# Patient Record
Sex: FEMALE | Race: WHITE | NOT HISPANIC OR LATINO | Employment: PART TIME | URBAN - METROPOLITAN AREA
[De-identification: names, ages, dates, MRNs, and addresses within clinical notes are randomized per-mention and may not be internally consistent; named-entity substitution may affect disease eponyms.]

---

## 2023-06-19 ENCOUNTER — ANESTHESIA EVENT (OUTPATIENT)
Dept: SURGERY | Facility: MEDICAL CENTER | Age: 43
End: 2023-06-19
Payer: COMMERCIAL

## 2023-06-19 ENCOUNTER — HOSPITAL ENCOUNTER (OUTPATIENT)
Dept: RADIOLOGY | Facility: MEDICAL CENTER | Age: 43
End: 2023-06-19

## 2023-06-19 ENCOUNTER — ANESTHESIA (OUTPATIENT)
Dept: SURGERY | Facility: MEDICAL CENTER | Age: 43
End: 2023-06-19
Payer: COMMERCIAL

## 2023-06-19 ENCOUNTER — HOSPITAL ENCOUNTER (EMERGENCY)
Facility: MEDICAL CENTER | Age: 43
End: 2023-06-19
Attending: EMERGENCY MEDICINE
Payer: COMMERCIAL

## 2023-06-19 ENCOUNTER — APPOINTMENT (OUTPATIENT)
Dept: RADIOLOGY | Facility: MEDICAL CENTER | Age: 43
End: 2023-06-19
Attending: STUDENT IN AN ORGANIZED HEALTH CARE EDUCATION/TRAINING PROGRAM
Payer: COMMERCIAL

## 2023-06-19 VITALS
DIASTOLIC BLOOD PRESSURE: 56 MMHG | HEART RATE: 85 BPM | RESPIRATION RATE: 20 BRPM | WEIGHT: 221.34 LBS | SYSTOLIC BLOOD PRESSURE: 108 MMHG | TEMPERATURE: 98.6 F | OXYGEN SATURATION: 96 %

## 2023-06-19 DIAGNOSIS — D68.00 VON WILLEBRAND DISEASE (HCC): ICD-10-CM

## 2023-06-19 DIAGNOSIS — S62.521B OPEN FRACTURE OF BASE OF DISTAL PHALANX OF RIGHT THUMB: ICD-10-CM
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
## 2023-06-19 LAB
ALBUMIN SERPL BCP-MCNC: 4.6 G/DL (ref 3.2–4.9)
ALBUMIN/GLOB SERPL: 1.7 G/DL
ALP SERPL-CCNC: 67 U/L (ref 30–99)
ALT SERPL-CCNC: 25 U/L (ref 2–50)
ANION GAP SERPL CALC-SCNC: 14 MMOL/L (ref 7–16)
APTT PPP: 33.9 SEC (ref 24.7–36)
AST SERPL-CCNC: 22 U/L (ref 12–45)
BASOPHILS # BLD AUTO: 0.6 % (ref 0–1.8)
BASOPHILS # BLD: 0.08 K/UL (ref 0–0.12)
BILIRUB SERPL-MCNC: 0.5 MG/DL (ref 0.1–1.5)
BUN SERPL-MCNC: 8 MG/DL (ref 8–22)
CALCIUM ALBUM COR SERPL-MCNC: 8.7 MG/DL (ref 8.5–10.5)
CALCIUM SERPL-MCNC: 9.2 MG/DL (ref 8.5–10.5)
CHLORIDE SERPL-SCNC: 104 MMOL/L (ref 96–112)
CO2 SERPL-SCNC: 21 MMOL/L (ref 20–33)
CREAT SERPL-MCNC: 0.79 MG/DL (ref 0.5–1.4)
EOSINOPHIL # BLD AUTO: 0.11 K/UL (ref 0–0.51)
EOSINOPHIL NFR BLD: 0.8 % (ref 0–6.9)
ERYTHROCYTE [DISTWIDTH] IN BLOOD BY AUTOMATED COUNT: 44 FL (ref 35.9–50)
GFR SERPLBLD CREATININE-BSD FMLA CKD-EPI: 95 ML/MIN/1.73 M 2
GLOBULIN SER CALC-MCNC: 2.7 G/DL (ref 1.9–3.5)
GLUCOSE SERPL-MCNC: 92 MG/DL (ref 65–99)
HCG SERPL QL: NEGATIVE
HCG UR QL: NEGATIVE
HCT VFR BLD AUTO: 40.2 % (ref 37–47)
HGB BLD-MCNC: 13.5 G/DL (ref 12–16)
IMM GRANULOCYTES # BLD AUTO: 0.04 K/UL (ref 0–0.11)
IMM GRANULOCYTES NFR BLD AUTO: 0.3 % (ref 0–0.9)
INR PPP: 1.09 (ref 0.87–1.13)
LYMPHOCYTES # BLD AUTO: 4.04 K/UL (ref 1–4.8)
LYMPHOCYTES NFR BLD: 29.8 % (ref 22–41)
MCH RBC QN AUTO: 30.4 PG (ref 27–33)
MCHC RBC AUTO-ENTMCNC: 33.6 G/DL (ref 32.2–35.5)
MCV RBC AUTO: 90.5 FL (ref 81.4–97.8)
MONOCYTES # BLD AUTO: 0.71 K/UL (ref 0–0.85)
MONOCYTES NFR BLD AUTO: 5.2 % (ref 0–13.4)
NEUTROPHILS # BLD AUTO: 8.58 K/UL (ref 1.82–7.42)
NEUTROPHILS NFR BLD: 63.3 % (ref 44–72)
NRBC # BLD AUTO: 0 K/UL
NRBC BLD-RTO: 0 /100 WBC (ref 0–0.2)
PLATELET # BLD AUTO: 263 K/UL (ref 164–446)
PMV BLD AUTO: 9.5 FL (ref 9–12.9)
POTASSIUM SERPL-SCNC: 4.2 MMOL/L (ref 3.6–5.5)
PROT SERPL-MCNC: 7.3 G/DL (ref 6–8.2)
PROTHROMBIN TIME: 14 SEC (ref 12–14.6)
RBC # BLD AUTO: 4.44 M/UL (ref 4.2–5.4)
SODIUM SERPL-SCNC: 139 MMOL/L (ref 135–145)
WBC # BLD AUTO: 13.6 K/UL (ref 4.8–10.8)

## 2023-06-19 PROCEDURE — 84703 CHORIONIC GONADOTROPIN ASSAY: CPT

## 2023-06-19 PROCEDURE — 160035 HCHG PACU - 1ST 60 MINS PHASE I: Performed by: STUDENT IN AN ORGANIZED HEALTH CARE EDUCATION/TRAINING PROGRAM

## 2023-06-19 PROCEDURE — C1713 ANCHOR/SCREW BN/BN,TIS/BN: HCPCS | Performed by: STUDENT IN AN ORGANIZED HEALTH CARE EDUCATION/TRAINING PROGRAM

## 2023-06-19 PROCEDURE — 81025 URINE PREGNANCY TEST: CPT

## 2023-06-19 PROCEDURE — 85025 COMPLETE CBC W/AUTO DIFF WBC: CPT

## 2023-06-19 PROCEDURE — 36415 COLL VENOUS BLD VENIPUNCTURE: CPT

## 2023-06-19 PROCEDURE — 700111 HCHG RX REV CODE 636 W/ 250 OVERRIDE (IP): Performed by: EMERGENCY MEDICINE

## 2023-06-19 PROCEDURE — 99291 CRITICAL CARE FIRST HOUR: CPT

## 2023-06-19 PROCEDURE — 700105 HCHG RX REV CODE 258: Performed by: ANESTHESIOLOGY

## 2023-06-19 PROCEDURE — 160002 HCHG RECOVERY MINUTES (STAT): Performed by: STUDENT IN AN ORGANIZED HEALTH CARE EDUCATION/TRAINING PROGRAM

## 2023-06-19 PROCEDURE — 700111 HCHG RX REV CODE 636 W/ 250 OVERRIDE (IP)

## 2023-06-19 PROCEDURE — 90715 TDAP VACCINE 7 YRS/> IM: CPT | Performed by: EMERGENCY MEDICINE

## 2023-06-19 PROCEDURE — 160036 HCHG PACU - EA ADDL 30 MINS PHASE I: Performed by: STUDENT IN AN ORGANIZED HEALTH CARE EDUCATION/TRAINING PROGRAM

## 2023-06-19 PROCEDURE — 160039 HCHG SURGERY MINUTES - EA ADDL 1 MIN LEVEL 3: Performed by: STUDENT IN AN ORGANIZED HEALTH CARE EDUCATION/TRAINING PROGRAM

## 2023-06-19 PROCEDURE — 85730 THROMBOPLASTIN TIME PARTIAL: CPT

## 2023-06-19 PROCEDURE — 99222 1ST HOSP IP/OBS MODERATE 55: CPT | Mod: 57 | Performed by: STUDENT IN AN ORGANIZED HEALTH CARE EDUCATION/TRAINING PROGRAM

## 2023-06-19 PROCEDURE — 700111 HCHG RX REV CODE 636 W/ 250 OVERRIDE (IP): Performed by: ANESTHESIOLOGY

## 2023-06-19 PROCEDURE — 80053 COMPREHEN METABOLIC PANEL: CPT

## 2023-06-19 PROCEDURE — 96365 THER/PROPH/DIAG IV INF INIT: CPT

## 2023-06-19 PROCEDURE — 11760 REPAIR OF NAIL BED: CPT | Mod: 80ROC,F5 | Performed by: STUDENT IN AN ORGANIZED HEALTH CARE EDUCATION/TRAINING PROGRAM

## 2023-06-19 PROCEDURE — 11760 REPAIR OF NAIL BED: CPT | Mod: F5 | Performed by: STUDENT IN AN ORGANIZED HEALTH CARE EDUCATION/TRAINING PROGRAM

## 2023-06-19 PROCEDURE — 160028 HCHG SURGERY MINUTES - 1ST 30 MINS LEVEL 3: Performed by: STUDENT IN AN ORGANIZED HEALTH CARE EDUCATION/TRAINING PROGRAM

## 2023-06-19 PROCEDURE — 01820 ANES CLSD PX RDS U/W/H BONES: CPT | Performed by: ANESTHESIOLOGY

## 2023-06-19 PROCEDURE — 160009 HCHG ANES TIME/MIN: Performed by: STUDENT IN AN ORGANIZED HEALTH CARE EDUCATION/TRAINING PROGRAM

## 2023-06-19 PROCEDURE — 26756 PIN FINGER FRACTURE EACH: CPT | Mod: F5 | Performed by: STUDENT IN AN ORGANIZED HEALTH CARE EDUCATION/TRAINING PROGRAM

## 2023-06-19 PROCEDURE — 700102 HCHG RX REV CODE 250 W/ 637 OVERRIDE(OP): Performed by: ANESTHESIOLOGY

## 2023-06-19 PROCEDURE — 700111 HCHG RX REV CODE 636 W/ 250 OVERRIDE (IP): Performed by: STUDENT IN AN ORGANIZED HEALTH CARE EDUCATION/TRAINING PROGRAM

## 2023-06-19 PROCEDURE — A9270 NON-COVERED ITEM OR SERVICE: HCPCS | Performed by: ANESTHESIOLOGY

## 2023-06-19 PROCEDURE — 96375 TX/PRO/DX INJ NEW DRUG ADDON: CPT

## 2023-06-19 PROCEDURE — 26756 PIN FINGER FRACTURE EACH: CPT | Mod: 80ROC,F5 | Performed by: STUDENT IN AN ORGANIZED HEALTH CARE EDUCATION/TRAINING PROGRAM

## 2023-06-19 PROCEDURE — 160048 HCHG OR STATISTICAL LEVEL 1-5: Performed by: STUDENT IN AN ORGANIZED HEALTH CARE EDUCATION/TRAINING PROGRAM

## 2023-06-19 PROCEDURE — 73120 X-RAY EXAM OF HAND: CPT | Mod: RT

## 2023-06-19 PROCEDURE — 90471 IMMUNIZATION ADMIN: CPT

## 2023-06-19 PROCEDURE — 700105 HCHG RX REV CODE 258: Performed by: EMERGENCY MEDICINE

## 2023-06-19 PROCEDURE — 85610 PROTHROMBIN TIME: CPT

## 2023-06-19 DEVICE — WIRE K- SMTH .045 4 (6TX6=36) (6EA/PK): Type: IMPLANTABLE DEVICE | Site: HAND | Status: FUNCTIONAL

## 2023-06-19 RX ORDER — BUPIVACAINE HYDROCHLORIDE 2.5 MG/ML
INJECTION, SOLUTION EPIDURAL; INFILTRATION; INTRACAUDAL
Status: DISCONTINUED | OUTPATIENT
Start: 2023-06-19 | End: 2023-06-19 | Stop reason: HOSPADM

## 2023-06-19 RX ORDER — HYDROMORPHONE HYDROCHLORIDE 1 MG/ML
0.4 INJECTION, SOLUTION INTRAMUSCULAR; INTRAVENOUS; SUBCUTANEOUS
Status: DISCONTINUED | OUTPATIENT
Start: 2023-06-19 | End: 2023-06-20 | Stop reason: HOSPADM

## 2023-06-19 RX ORDER — HYDROMORPHONE HYDROCHLORIDE 1 MG/ML
0.5 INJECTION, SOLUTION INTRAMUSCULAR; INTRAVENOUS; SUBCUTANEOUS ONCE
Status: COMPLETED | OUTPATIENT
Start: 2023-06-19 | End: 2023-06-19

## 2023-06-19 RX ORDER — M-VIT,TX,IRON,MINS/CALC/FOLIC 27MG-0.4MG
1 TABLET ORAL DAILY
COMMUNITY

## 2023-06-19 RX ORDER — AMOXICILLIN AND CLAVULANATE POTASSIUM 875; 125 MG/1; MG/1
1 TABLET, FILM COATED ORAL 2 TIMES DAILY
Qty: 14 TABLET | Refills: 0 | Status: SHIPPED | OUTPATIENT
Start: 2023-06-19 | End: 2023-06-26

## 2023-06-19 RX ORDER — OXYCODONE HCL 5 MG/5 ML
10 SOLUTION, ORAL ORAL
Status: COMPLETED | OUTPATIENT
Start: 2023-06-19 | End: 2023-06-19

## 2023-06-19 RX ORDER — SODIUM CHLORIDE, SODIUM LACTATE, POTASSIUM CHLORIDE, CALCIUM CHLORIDE 600; 310; 30; 20 MG/100ML; MG/100ML; MG/100ML; MG/100ML
INJECTION, SOLUTION INTRAVENOUS
Status: DISCONTINUED | OUTPATIENT
Start: 2023-06-19 | End: 2023-06-19 | Stop reason: SURG

## 2023-06-19 RX ORDER — LABETALOL HYDROCHLORIDE 5 MG/ML
5 INJECTION, SOLUTION INTRAVENOUS
Status: DISCONTINUED | OUTPATIENT
Start: 2023-06-19 | End: 2023-06-20 | Stop reason: HOSPADM

## 2023-06-19 RX ORDER — ONDANSETRON 2 MG/ML
INJECTION INTRAMUSCULAR; INTRAVENOUS PRN
Status: DISCONTINUED | OUTPATIENT
Start: 2023-06-19 | End: 2023-06-19 | Stop reason: SURG

## 2023-06-19 RX ORDER — HYDROMORPHONE HYDROCHLORIDE 1 MG/ML
0.2 INJECTION, SOLUTION INTRAMUSCULAR; INTRAVENOUS; SUBCUTANEOUS
Status: DISCONTINUED | OUTPATIENT
Start: 2023-06-19 | End: 2023-06-20 | Stop reason: HOSPADM

## 2023-06-19 RX ORDER — MIDAZOLAM HYDROCHLORIDE 1 MG/ML
1 INJECTION INTRAMUSCULAR; INTRAVENOUS
Status: DISCONTINUED | OUTPATIENT
Start: 2023-06-19 | End: 2023-06-20 | Stop reason: HOSPADM

## 2023-06-19 RX ORDER — IPRATROPIUM BROMIDE AND ALBUTEROL SULFATE 2.5; .5 MG/3ML; MG/3ML
3 SOLUTION RESPIRATORY (INHALATION)
Status: DISCONTINUED | OUTPATIENT
Start: 2023-06-19 | End: 2023-06-20 | Stop reason: HOSPADM

## 2023-06-19 RX ORDER — SODIUM CHLORIDE, SODIUM LACTATE, POTASSIUM CHLORIDE, CALCIUM CHLORIDE 600; 310; 30; 20 MG/100ML; MG/100ML; MG/100ML; MG/100ML
1000 INJECTION, SOLUTION INTRAVENOUS ONCE
Status: COMPLETED | OUTPATIENT
Start: 2023-06-19 | End: 2023-06-19

## 2023-06-19 RX ORDER — BENAZEPRIL HYDROCHLORIDE 5 MG/1
5 TABLET ORAL DAILY
COMMUNITY

## 2023-06-19 RX ORDER — OXYCODONE HYDROCHLORIDE 5 MG/1
5 TABLET ORAL EVERY 4 HOURS PRN
Qty: 15 TABLET | Refills: 0 | Status: SHIPPED | OUTPATIENT
Start: 2023-06-19 | End: 2023-06-22

## 2023-06-19 RX ORDER — HALOPERIDOL 5 MG/ML
1 INJECTION INTRAMUSCULAR
Status: DISCONTINUED | OUTPATIENT
Start: 2023-06-19 | End: 2023-06-20 | Stop reason: HOSPADM

## 2023-06-19 RX ORDER — ONDANSETRON 2 MG/ML
4 INJECTION INTRAMUSCULAR; INTRAVENOUS
Status: DISCONTINUED | OUTPATIENT
Start: 2023-06-19 | End: 2023-06-20 | Stop reason: HOSPADM

## 2023-06-19 RX ORDER — SODIUM CHLORIDE, SODIUM LACTATE, POTASSIUM CHLORIDE, CALCIUM CHLORIDE 600; 310; 30; 20 MG/100ML; MG/100ML; MG/100ML; MG/100ML
INJECTION, SOLUTION INTRAVENOUS CONTINUOUS
Status: DISCONTINUED | OUTPATIENT
Start: 2023-06-19 | End: 2023-06-20 | Stop reason: HOSPADM

## 2023-06-19 RX ORDER — HYDROMORPHONE HYDROCHLORIDE 1 MG/ML
0.1 INJECTION, SOLUTION INTRAMUSCULAR; INTRAVENOUS; SUBCUTANEOUS
Status: DISCONTINUED | OUTPATIENT
Start: 2023-06-19 | End: 2023-06-20 | Stop reason: HOSPADM

## 2023-06-19 RX ORDER — OXYCODONE HCL 5 MG/5 ML
5 SOLUTION, ORAL ORAL
Status: COMPLETED | OUTPATIENT
Start: 2023-06-19 | End: 2023-06-19

## 2023-06-19 RX ORDER — ATORVASTATIN CALCIUM 40 MG/1
40 TABLET, FILM COATED ORAL NIGHTLY
COMMUNITY

## 2023-06-19 RX ORDER — MEPERIDINE HYDROCHLORIDE 25 MG/ML
12.5 INJECTION INTRAMUSCULAR; INTRAVENOUS; SUBCUTANEOUS
Status: DISCONTINUED | OUTPATIENT
Start: 2023-06-19 | End: 2023-06-20 | Stop reason: HOSPADM

## 2023-06-19 RX ORDER — MECLIZINE HYDROCHLORIDE 25 MG/1
25 TABLET ORAL 3 TIMES DAILY PRN
COMMUNITY

## 2023-06-19 RX ORDER — CEFAZOLIN SODIUM 1 G/3ML
INJECTION, POWDER, FOR SOLUTION INTRAMUSCULAR; INTRAVENOUS PRN
Status: DISCONTINUED | OUTPATIENT
Start: 2023-06-19 | End: 2023-06-19 | Stop reason: SURG

## 2023-06-19 RX ORDER — HYDRALAZINE HYDROCHLORIDE 20 MG/ML
5 INJECTION INTRAMUSCULAR; INTRAVENOUS
Status: DISCONTINUED | OUTPATIENT
Start: 2023-06-19 | End: 2023-06-20 | Stop reason: HOSPADM

## 2023-06-19 RX ORDER — DIPHENHYDRAMINE HYDROCHLORIDE 50 MG/ML
12.5 INJECTION INTRAMUSCULAR; INTRAVENOUS
Status: DISCONTINUED | OUTPATIENT
Start: 2023-06-19 | End: 2023-06-20 | Stop reason: HOSPADM

## 2023-06-19 RX ORDER — MIDAZOLAM HYDROCHLORIDE 1 MG/ML
INJECTION INTRAMUSCULAR; INTRAVENOUS PRN
Status: DISCONTINUED | OUTPATIENT
Start: 2023-06-19 | End: 2023-06-19 | Stop reason: SURG

## 2023-06-19 RX ORDER — MEPERIDINE HYDROCHLORIDE 25 MG/ML
INJECTION INTRAMUSCULAR; INTRAVENOUS; SUBCUTANEOUS
Status: COMPLETED
Start: 2023-06-19 | End: 2023-06-19

## 2023-06-19 RX ORDER — EPHEDRINE SULFATE 50 MG/ML
5 INJECTION, SOLUTION INTRAVENOUS
Status: DISCONTINUED | OUTPATIENT
Start: 2023-06-19 | End: 2023-06-20 | Stop reason: HOSPADM

## 2023-06-19 RX ADMIN — MEPERIDINE HYDROCHLORIDE 12.5 MG: 25 INJECTION INTRAMUSCULAR; INTRAVENOUS; SUBCUTANEOUS at 20:08

## 2023-06-19 RX ADMIN — ONDANSETRON 4 MG: 2 INJECTION INTRAMUSCULAR; INTRAVENOUS at 19:26

## 2023-06-19 RX ADMIN — FENTANYL CITRATE 100 MCG: 50 INJECTION, SOLUTION INTRAMUSCULAR; INTRAVENOUS at 13:30

## 2023-06-19 RX ADMIN — OXYCODONE HYDROCHLORIDE 10 MG: 5 SOLUTION ORAL at 20:28

## 2023-06-19 RX ADMIN — SODIUM CHLORIDE, POTASSIUM CHLORIDE, SODIUM LACTATE AND CALCIUM CHLORIDE 1000 ML: 600; 310; 30; 20 INJECTION, SOLUTION INTRAVENOUS at 13:29

## 2023-06-19 RX ADMIN — PROPOFOL 200 MG: 10 INJECTION, EMULSION INTRAVENOUS at 19:21

## 2023-06-19 RX ADMIN — MIDAZOLAM 2 MG: 1 INJECTION, SOLUTION INTRAMUSCULAR; INTRAVENOUS at 19:19

## 2023-06-19 RX ADMIN — AMPICILLIN AND SULBACTAM 3 G: 1; 2 INJECTION, POWDER, FOR SOLUTION INTRAMUSCULAR; INTRAVENOUS at 14:42

## 2023-06-19 RX ADMIN — SODIUM CHLORIDE, POTASSIUM CHLORIDE, SODIUM LACTATE AND CALCIUM CHLORIDE: 600; 310; 30; 20 INJECTION, SOLUTION INTRAVENOUS at 20:30

## 2023-06-19 RX ADMIN — SODIUM CHLORIDE, POTASSIUM CHLORIDE, SODIUM LACTATE AND CALCIUM CHLORIDE: 600; 310; 30; 20 INJECTION, SOLUTION INTRAVENOUS at 19:17

## 2023-06-19 RX ADMIN — CEFAZOLIN 2 G: 1 INJECTION, POWDER, FOR SOLUTION INTRAMUSCULAR; INTRAVENOUS at 19:24

## 2023-06-19 RX ADMIN — HYDROMORPHONE HYDROCHLORIDE 0.5 MG: 1 INJECTION, SOLUTION INTRAMUSCULAR; INTRAVENOUS; SUBCUTANEOUS at 14:54

## 2023-06-19 RX ADMIN — CLOSTRIDIUM TETANI TOXOID ANTIGEN (FORMALDEHYDE INACTIVATED), CORYNEBACTERIUM DIPHTHERIAE TOXOID ANTIGEN (FORMALDEHYDE INACTIVATED), BORDETELLA PERTUSSIS TOXOID ANTIGEN (GLUTARALDEHYDE INACTIVATED), BORDETELLA PERTUSSIS FILAMENTOUS HEMAGGLUTININ ANTIGEN (FORMALDEHYDE INACTIVATED), BORDETELLA PERTUSSIS PERTACTIN ANTIGEN, AND BORDETELLA PERTUSSIS FIMBRIAE 2/3 ANTIGEN 0.5 ML: 5; 2; 2.5; 5; 3; 5 INJECTION, SUSPENSION INTRAMUSCULAR at 14:53

## 2023-06-19 ASSESSMENT — PAIN DESCRIPTION - PAIN TYPE
TYPE: ACUTE PAIN
TYPE: SURGICAL PAIN
TYPE: SURGICAL PAIN
TYPE: ACUTE PAIN
TYPE: SURGICAL PAIN
TYPE: ACUTE PAIN

## 2023-06-19 NOTE — ED PROVIDER NOTES
ED Provider Note    CHIEF COMPLAINT  Chief Complaint   Patient presents with    Sent by MD           Dog Bite       EXTERNAL RECORDS REVIEWED  External ED Note from [facility], noted to have x-ray with midshaft distal phalanx fracture, almost circumferential laceration, she was given Ancef and tetanus    HPI/ROS  LIMITATION TO HISTORY   Select: : None  OUTSIDE HISTORIAN(S):  none    Aissatou John is a 42 y.o. female who presents with a dog bite and injury to her right thumb.  Patient is right-hand dominant, and around 7:00 this morning she was trying to restrain her dog which is a Doberman and up-to-date on immunizations.  She was bitten on the thumb, presented to outside facility and transferred here.  She reports pain to the thumb, no other focal areas of pain or injury.  Some tingling sensation as well.    She has no other recent illness fevers chills cough or congestion.  Does have a history of von Willebrand's disease        PAST MEDICAL HISTORY   has a past medical history of Von Willebrand disease (HCC).    SURGICAL HISTORY  patient denies any surgical history    FAMILY HISTORY  History reviewed. No pertinent family history.    SOCIAL HISTORY  Social History     Tobacco Use    Smoking status: Every Day     Types: Cigarettes    Smokeless tobacco: Not on file   Substance and Sexual Activity    Alcohol use: Not Currently    Drug use: Not Currently    Sexual activity: Not on file       CURRENT MEDICATIONS  Home Medications       Reviewed by Solomon Chadwick (Pharmacy Tech) on 06/19/23 at 1414  Med List Status: Complete     Medication Last Dose Status   atorvastatin (LIPITOR) 40 MG Tab 6/18/2023 Active   benazepril (LOTENSIN) 5 MG Tab 6/19/2023 Active   meclizine (ANTIVERT) 25 MG Tab 6/13/2023 Active   therapeutic multivitamin-minerals (THERAGRAN-M) Tab 6/19/2023 Active                    ALLERGIES  Allergies   Allergen Reactions    Codeine Vomiting and Nausea       PHYSICAL EXAM  VITAL  SIGNS: /72   Pulse 80   Temp 35.8 °C (96.5 °F) (Temporal)   Resp 16   Wt 100 kg (221 lb 5.5 oz)   SpO2 95%      Pulse ox interpretation: I interpret this pulse ox as normal.  Constitutional: Alert uncomfortable  HENT: Normocephalic, Atraumatic, Bilateral external ears normal. Nose normal.   Eyes: Pupils are equal and reactive. Conjunctiva normal, non-icteric.   Heart: Regular rate and rythm, no murmurs.    Lungs: Clear to auscultation bilaterally.      MSK:  Significant pain with removal of dressing and tenderness to the area pictured below.              Skin: Warm, Dry, No erythema, No rash.   Neurologic: Alert, Grossly non-focal.   Psychiatric: Affect normal, Judgment normal, Mood normal, Appears appropriate                   DIAGNOSTIC STUDIES / PROCEDURES  Labs Reviewed   CBC WITH DIFFERENTIAL - Abnormal; Notable for the following components:       Result Value    WBC 13.6 (*)     Neutrophils (Absolute) 8.58 (*)     All other components within normal limits   COMP METABOLIC PANEL   APTT    Narrative:     Indicate which anticoagulants the patient is on:->UNKNOWN   PROTHROMBIN TIME    Narrative:     Indicate which anticoagulants the patient is on:->UNKNOWN   CORRECTED CALCIUM   ESTIMATED GFR         RADIOLOGY  I have independently interpreted the diagnostic imaging associated with this visit and am waiting the final reading from the radiologist.   My preliminary interpretation is as follows: In reviewing the images from outlying facility there is a midshaft distal phalanx transverse fracture displaced      COURSE & MEDICAL DECISION MAKING      ED Observation Status? Yes; I am placing the patient in to an observation status due to a diagnostic uncertainty as well as therapeutic intensity. Patient placed in observation status at 1:26 PM, 6/19/2023.     Observation plan is as follows: IV antibiotics, pain control, definitive orthopedic consultation  Patient's care signed out to the oncoming emergency  physician Dr. Barahona for ED observation as above        INITIAL ASSESSMENT, COURSE AND PLAN  Care Narrative: 1:26 PM  Patient evaluated at the bedside, she does have a large laceration consistent with open fracture from a dog bite to her hand.  Distal capillary refill is intact, she does have some tingling sensation distally does appear to have viable tissue.  Will order additional antibiotics, orthopedic consultation, pain control    I discussed the case with the scrub tech in the ER with orthopedics, unfortunately are scrubbed in and cannot evaluate the patient, will keep the patient n.p.o. for orthopedic evaluation as I do not feel this is something amenable to primary closure by me at this time    HYDRATION: Based on the patient's presentation of Inability to take oral fluids the patient was given IV fluids. IV Hydration was used because oral hydration was not adequate alone. Upon recheck following hydration, the patient was stable.      ADDITIONAL PROBLEM LIST  #Open distal phalanx fracture.  Secondary to dog bite, patient with almost circumferential laceration around her mid portion of the distal phalanx of her right thumb and underlying displaced fracture.  This was irrigated prior, she was given Ancef at outside facility, started on Unasyn here, tetanus updated.  Wet dressing applied orthopedics consulted she is pending ultimate disposition after orthopedic evaluation for potential closure in the OR versus closure with orthopedics in the ER    #Dog bite, resulting in above.    #Von Willebrand's factor.  No findings of persistent bleeding here.  Pharmacy was consulted with potential need for DDAVP prior to OR although without bleeding at this time no current indications      DISPOSITION AND DISCUSSIONS  I have discussed management of the patient with the following physicians and VILLA's: Orthopedics team consulted for surgical evaluation    Discussion of management with other [facility] or appropriate source(s):  Pharmacy for DDAVP needs          FINAL DIAGNOSIS  1. Dog bite, initial encounter    2. Open fracture of base of distal phalanx of right thumb    3. Von Willebrand disease (HCC)           Electronically signed by: Javier Ochoa M.D., 6/19/2023 1:12 PM

## 2023-06-19 NOTE — ED NOTES
Med rec updated and complete. Allergies reviewed. Confirmed name and date of birth.        No antibiotic use in last 30 days.        Home pharmacy   [facility] = [phone]

## 2023-06-19 NOTE — ED NOTES
Delay to due to crit pt.  Report called to Bobbi STEVE in pre-op.  Pt reports last PO fluid at 0630 was coffee and last PO food was dinner last night.

## 2023-06-19 NOTE — DISCHARGE SUMMARY
ED Observation Discharge Summary    Patient:Aissatou John  Patient : 1980  Patient MRN: [id number]  Patient PCP: No primary care provider on file.    Admit Date: 2023  Discharge Date and Time: 23 4:02 PM  Discharge Diagnosis:   1. Dog bite, initial encounter        2. Open fracture of base of distal phalanx of right thumb        3. Von Willebrand disease (HCC)           Discharge Attending: Lenard Rivera M.D.  Discharge Service: ED Observation    ED Course  Aissatou is a 42 y.o. female who was evaluated at [facility] with an open fracture from a dog bite.  Signed out to me pending orthopedic surgical consultation.  Orthopedic surgeon Dr. Hilliard has decided to take the patient to the operating room for repair.  Case was discussed with ALPESH Swenson with orthopedics    Discharge Exam:  /68   Pulse 70   Temp 35.8 °C (96.5 °F) (Temporal)   Resp 16   Wt 100 kg (221 lb 5.5 oz)   SpO2 94% .    Constitutional: Awake and alert. Nontoxic  HENT:  Grossly normal  Thorax & Lungs: No respiratory distress    Labs  Results for orders placed or performed during the hospital encounter of 23   CBC WITH DIFFERENTIAL   Result Value Ref Range    WBC 13.6 (H) 4.8 - 10.8 K/uL    RBC 4.44 4.20 - 5.40 M/uL    Hemoglobin 13.5 12.0 - 16.0 g/dL    Hematocrit 40.2 37.0 - 47.0 %    MCV 90.5 81.4 - 97.8 fL    MCH 30.4 27.0 - 33.0 pg    MCHC 33.6 32.2 - 35.5 g/dL    RDW 44.0 35.9 - 50.0 fL    Platelet Count 263 164 - 446 K/uL    MPV 9.5 9.0 - 12.9 fL    Neutrophils-Polys 63.30 44.00 - 72.00 %    Lymphocytes 29.80 22.00 - 41.00 %    Monocytes 5.20 0.00 - 13.40 %    Eosinophils 0.80 0.00 - 6.90 %    Basophils 0.60 0.00 - 1.80 %    Immature Granulocytes 0.30 0.00 - 0.90 %    Nucleated RBC 0.00 0.00 - 0.20 /100 WBC    Neutrophils (Absolute) 8.58 (H) 1.82 - 7.42 K/uL    Lymphs (Absolute) 4.04 1.00 - 4.80 K/uL    Monos (Absolute) 0.71 0.00 - 0.85 K/uL    Eos (Absolute) 0.11 0.00 - 0.51 K/uL     Baso (Absolute) 0.08 0.00 - 0.12 K/uL    Immature Granulocytes (abs) 0.04 0.00 - 0.11 K/uL    NRBC (Absolute) 0.00 K/uL   COMP METABOLIC PANEL   Result Value Ref Range    Sodium 139 135 - 145 mmol/L    Potassium 4.2 3.6 - 5.5 mmol/L    Chloride 104 96 - 112 mmol/L    Co2 21 20 - 33 mmol/L    Anion Gap 14.0 7.0 - 16.0    Glucose 92 65 - 99 mg/dL    Bun 8 8 - 22 mg/dL    Creatinine 0.79 0.50 - 1.40 mg/dL    Calcium 9.2 8.5 - 10.5 mg/dL    AST(SGOT) 22 12 - 45 U/L    ALT(SGPT) 25 2 - 50 U/L    Alkaline Phosphatase 67 30 - 99 U/L    Total Bilirubin 0.5 0.1 - 1.5 mg/dL    Albumin 4.6 3.2 - 4.9 g/dL    Total Protein 7.3 6.0 - 8.2 g/dL    Globulin 2.7 1.9 - 3.5 g/dL    A-G Ratio 1.7 g/dL   APTT   Result Value Ref Range    APTT 33.9 24.7 - 36.0 sec   PROTHROMBIN TIME (INR)   Result Value Ref Range    PT 14.0 12.0 - 14.6 sec    INR 1.09 0.87 - 1.13   CORRECTED CALCIUM   Result Value Ref Range    Correct Calcium 8.7 8.5 - 10.5 mg/dL   ESTIMATED GFR   Result Value Ref Range    GFR (CKD-EPI) 95 >60 mL/min/1.73 m 2       Radiology  OUTSIDE IMAGES-DX UPPER EXTREMITY, RIGHT   Final Result          Medications:   New Prescriptions    No medications on file       My final assessment includes   1. Dog bite, initial encounter        2. Open fracture of base of distal phalanx of right thumb        3. Von Willebrand disease (HCC)           Upon Reevaluation, the patient's condition has: not improved; and will be escalated to hospitalization.    Patient discharged from ED Observation status at 4:02 PM (Time) 6/19/2023 (Date).     Total time spent on this ED Observation discharge encounter is < 30 Minutes    Electronically signed by: Lenard Rivera M.D., 6/19/2023 4:02 PM

## 2023-06-19 NOTE — ED TRIAGE NOTES
Pt comes in complaining of a dog bite to her R hand. Pt was seen in [place] and diagnosed with an open fracture. Pt given antibiotics PTA.

## 2023-06-20 NOTE — CONSULTS
6/19/2023    Time Called: 1400  Time Arrived: 1800      HPI: Aissatou John is a 42 y.o. female who presents with right thumb partial amputation after dog bite by her Doberman.  She was seen outside hospital and transferred here for higher level care.  She notes some numbness and tingling in her thumb.  Good capillary refill was noted on initial exam.    Past Medical History:   Diagnosis Date    Von Willebrand disease (HCC)        History reviewed. No pertinent surgical history.    Medications  No current facility-administered medications on file prior to encounter.     Current Outpatient Medications on File Prior to Encounter   Medication Sig Dispense Refill    atorvastatin (LIPITOR) 40 MG Tab Take 40 mg by mouth every evening.      benazepril (LOTENSIN) 5 MG Tab Take 5 mg by mouth every day.      therapeutic multivitamin-minerals (THERAGRAN-M) Tab Take 1 Tablet by mouth every day.      meclizine (ANTIVERT) 25 MG Tab Take 25 mg by mouth 3 times a day as needed for Dizziness or Vertigo.         Allergies  Codeine    ROS  . All other systems were reviewed and found to be negative    History reviewed. No pertinent family history.    Social History     Socioeconomic History    Marital status:    Tobacco Use    Smoking status: Every Day     Types: Cigarettes   Substance and Sexual Activity    Alcohol use: Not Currently    Drug use: Not Currently       Physical Exam  Vitals  /75   Pulse 76   Temp 36.3 °C (97.4 °F) (Temporal)   Resp 15   Wt 100 kg (221 lb 5.5 oz)   SpO2 94%   General: Well Developed, Well Nourished, Age appropriate appearance  HEENT: Normocephalic, atraumatic  Psych: Normal mood and affect  Neck: Supple, nontender, no masses  Lungs: Breathing unlabored, No audible wheezing  Heart: Regular heart rate and rhythm  Abdomen: Soft, NT, ND  Neuro: Sensation grossly intact to BUE and BLE, moving all four extremities  Skin: Partial amputation right thumb  Vascular: , Capillary refill <2  seconds  MSK: Right hand: Partial amputation right thumb with open fracture noted.  Sensation intact although patchy paresthesias of her thumb.  She does have adequate capillary refill.      Radiographs:  OUTSIDE IMAGES-DX UPPER EXTREMITY, RIGHT   Final Result      DX-PORTABLE FLUORO > 1 HOUR    (Results Pending)   DX-HAND 2- RIGHT    (Results Pending)       Laboratory Values  Recent Labs     06/19/23  1326   WBC 13.6*   RBC 4.44   HEMOGLOBIN 13.5   HEMATOCRIT 40.2   MCV 90.5   MCH 30.4   MCHC 33.6   RDW 44.0   PLATELETCT 263   MPV 9.5     Recent Labs     06/19/23  1326   SODIUM 139   POTASSIUM 4.2   CHLORIDE 104   CO2 21   GLUCOSE 92   BUN 8     Recent Labs     06/19/23  1326   APTT 33.9   INR 1.09         Impression: 42-year-old female dog bite with circumferential wound and open fracture of her right thumb.  Plan for debridement closure and percutaneous pinning today.  Likely need to follow-up with hand specialist.  Home from PACU today, follow-up in 2 weeks.    Plan:We discussed the diagnosis and findings with the patient at length.  We reviewed possible non operative and operative interventions and the risks and benefits of each of these.  she had a chance to ask questions and all of these were answered to her satisfaction. The patient chose to proceed with surgical intervention. Risks and benefits of surgery were discussed which include but are not limited to bleeding, infection, neurovascular damage, malunion, nonunion, instability, limb length discrepancy, DVT, PE, MI, Stroke and death. They understand these risks and wish to proceed.      Malcolm Hilliard MD  Orthopedic Trauma Surgery

## 2023-06-20 NOTE — ANESTHESIA POSTPROCEDURE EVALUATION
Patient: Aissatou John    Procedure Summary     Date: 06/19/23 Room / Location: [place] [address] / SURGERY [facility]    Anesthesia Start: 1917 Anesthesia Stop: 2008    Procedures:       PINNING, FRACTURE, PERCUTANEOUS, NAIL BED REPAIR (Right: Hand)      INCISION AND DRAINAGE, WOUND, BY ORTHOPEDICS (Right: Hand) Diagnosis: (OPEN FRACTURE, PARTIAL AMPUTATION RIGHT THUMB)    Surgeons: Malcolm Hilliard M.D. Responsible Provider: Kevin Almaraz M.D.    Anesthesia Type: general ASA Status: 1          Final Anesthesia Type: general  Last vitals  BP   Blood Pressure: 132/75    Temp   36.3 °C (97.4 °F)    Pulse   76   Resp   15    SpO2   94 %      Anesthesia Post Evaluation    Patient location during evaluation: PACU  Patient participation: complete - patient participated  Level of consciousness: awake and alert    Airway patency: patent  Anesthetic complications: no  Cardiovascular status: hemodynamically stable  Respiratory status: acceptable  Hydration status: euvolemic    PONV: none          No notable events documented.     Nurse Pain Score: 4 (NPRS)

## 2023-06-20 NOTE — DISCHARGE INSTRUCTIONS
HOME CARE INSTRUCTIONS    ACTIVITY: Rest and take it easy for the first 24 hours.  A responsible adult is recommended to remain with you during that time.  It is normal to feel sleepy.  We encourage you to not do anything that requires balance, judgment or coordination.    FOR 24 HOURS DO NOT:  Drive, operate machinery or run household appliances.  Drink beer or alcoholic beverages.  Make important decisions or sign legal documents.    SPECIAL INSTRUCTIONS:   Non weightnearing on operative hand  Keep splint clean and dry for the next 2 weeks  Call [facility] to schedule 2 week follow-up appointment  Take pain medication as needed for the first couple days then switch to Tylenol  Take 7-day course of oral antibiotics    DIET: To avoid nausea, slowly advance diet as tolerated, avoiding spicy or greasy foods for the first day.  Add more substantial food to your diet according to your physician's instructions.  Babies can be fed formula or breast milk as soon as they are hungry.  INCREASE FLUIDS AND FIBER TO AVOID CONSTIPATION.    MEDICATIONS: Resume taking daily medication.  Take prescribed pain medication with food.  If no medication is prescribed, you may take non-aspirin pain medication if needed.  PAIN MEDICATION CAN BE VERY CONSTIPATING.  Take a stool softener or laxative such as senokot, pericolace, or milk of magnesia if needed.    Prescription given for oxycodone, Augmentin.  Last pain medication given at ______.    A follow-up appointment should be arranged with your doctor in 2 weeks; call to schedule.    You should CALL YOUR PHYSICIAN if you develop:  Fever greater than 101 degrees F.  Pain not relieved by medication, or persistent nausea or vomiting.  Excessive bleeding (blood soaking through dressing) or unexpected drainage from the wound.  Extreme redness or swelling around the incision site, drainage of pus or foul smelling drainage.  Inability to urinate or empty your bladder within 8 hours.  Problems with  breathing or chest pain.    You should call 911 if you develop problems with breathing or chest pain.  If you are unable to contact your doctor or surgical center, you should go to the nearest emergency room or urgent care center.  Physician's telephone #: [phone]    MILD FLU-LIKE SYMPTOMS ARE NORMAL.  YOU MAY EXPERIENCE GENERALIZED MUSCLE ACHES, THROAT IRRITATION, HEADACHE AND/OR SOME NAUSEA.    If any questions arise, call your doctor.  If your doctor is not available, please feel free to call the Surgical Center at [phone].  The Center is open Monday through Friday from 7AM to 7PM.      A registered nurse may call you a few days after your surgery to see how you are doing after your procedure.    You may also receive a survey in the mail within the next two weeks and we ask that you take a few moments to complete the survey and return it to us.  Our goal is to provide you with very good care and we value your comments.

## 2023-06-20 NOTE — OP REPORT
DATE OF OPERATION: 6/19/2023     PREOPERATIVE DIAGNOSIS: Open fracture/partial amputation right thumb    POSTOPERATIVE DIAGNOSIS: Same    PROCEDURE PERFORMED: Irrigation debridement closure and percutaneous pinning right thumb  Right thumb nail bed repair    SURGEON: Malcolm Hilliard M.D.     ASSISTANT: Abiodun Sandhu    ANESTHESIA: General    SPECIMEN: None    ESTIMATED BLOOD LOSS: 5 mL    IMPLANTS: K wires x2      INDICATIONS: The patient is a 42 y.o. female who presented with open fracture of the distal phalanx of the thumb after dog bite.  I discussed the risks and benefits of the procedure which include but are not limited to risks of infection, wound healing complication, neurovascular injury, pain, malunion, non-union, malrotation, and the medical risks of anesthesia including MI, stroke, and death.  Alternatives to surgery were also discussed, including non-operative management, which I did not recommend.  The patient was in agreement with the plan to proceed, and the informed consent was signed and documented.  I met with the patient pre-operatively and marked the operative extremity with their agreement.  We proceeded to the operating room.     DESCRIPTION OF PROCEDURE:  Patient was seen in the preoperative holding area on the day of surgery. The operative site was marked with my initials.  she was taken to the operating room and placed supine on the operative table.  Anesthesia was induced.  The operative extremity was prepped and draped in the normal sterile fashion.  Operative pause was conducted and the correct patient, site, side, procedure, and surgeon's initials on extremity were identified.  The right thumb wound was examined debrided and irrigated with sterile saline.  Extensor flexor tendons appeared to be intact.  Distal phalanx fracture was reduced and K wire pins were placed percutaneously for fixation.  Wound was then closed with 3-0 nylon sutures.  Patient was placed in a thumb spica  splint after sterile dressings applied.  Patient was awoken taken to PACU in stable condition.    POSTOPERATIVE PLAN: Nonweightbearing right hand.  Discharge home from PACU this evening.  Pain medication and antibiotics called in the pharmacy.  The patient will follow up in clinic in 2 weeks to check wounds and remove sutures/staples.      ____________________________________   Malcolm Hilliard M.D.   DD: 6/19/2023  7:17 PM

## 2023-06-20 NOTE — OR NURSING
2009: Rec'd pt from OR rn and MD. Assessed pt for lightly sedated, but easily arousable. Pt denies pain at this time, but has moderate amount of shivering. Will give demerol ivp for shivering. Placed on pt a warm blanket and bear hugger heater. VS stable. Assessed surgical site for no drainage. Pt able to move all four fingers except thumb r/t splinted with dressing. Nailbeds pink and brisk, with no visible swelling.     2030: VS stable. Demerol effective for shivering. Pt c/o pain. Gave 10 mg of Roxicodone po. Pt tolerated po medications and water well.     2115: Vs stable. No change from previous surgical site dressing assessment.  at bedside. Pt denies pain, nausea, or vomiting. Phase 1 complete. Preparing to discharge pt into 's care after discharge and follow-up instructions are given.     2130: Discharging pt home into 's care.

## 2023-06-20 NOTE — ANESTHESIA PREPROCEDURE EVALUATION
Case: [id number] Date/Time: 06/19/23 2054    Procedures:       PINNING, FRACTURE, PERCUTANEOUS (Right) - Thumb      INCISION AND DRAINAGE, WOUND, BY ORTHOPEDICS    Anesthesia type: General    Location: [place] [address] / SURGERY [facility]    Surgeons: Malcolm Hilliard M.D.          Relevant Problems   No relevant active problems       Physical Exam    Airway   Mallampati: II  TM distance: >3 FB  Neck ROM: full       Cardiovascular - normal exam  Rhythm: regular  Rate: normal  (-) murmur     Dental - normal exam           Pulmonary - normal exam  Breath sounds clear to auscultation     Abdominal    Neurological - normal exam                 Anesthesia Plan    ASA 1       Plan - general       Airway plan will be LMA          Induction: intravenous    Postoperative Plan: Postoperative administration of opioids is intended.    Pertinent diagnostic labs and testing reviewed    Informed Consent:    Anesthetic plan and risks discussed with patient.    Use of blood products discussed with: patient whom consented to blood products.

## 2023-06-20 NOTE — ANESTHESIA TIME REPORT
Anesthesia Start and Stop Event Times     Date Time Event    6/19/2023 1910 Ready for Procedure     1917 Anesthesia Start     2008 Anesthesia Stop        Responsible Staff  06/19/23    Name Role Begin End    Kevin Almaraz M.D. Anesth 1917 2008        Overtime Reason:  no overtime (within assigned shift)    Comments:

## 2023-06-20 NOTE — ANESTHESIA PROCEDURE NOTES
Airway    Date/Time: 6/19/2023 7:22 PM    Performed by: Kevin Almaraz M.D.  Authorized by: Kevin Almaraz M.D.    Location:  OR  Urgency:  Elective  Difficult Airway: No    Indications for Airway Management:  Anesthesia      Spontaneous Ventilation: absent    Sedation Level:  Deep  Preoxygenated: Yes    Patient Position:  Sniffing  Mask Difficulty Assessment:  1 - vent by mask  Final Airway Type:  Supraglottic airway  Final Supraglottic Airway:  Standard LMA    SGA Size:  4  Number of Attempts at Approach:  1

## (undated) DEVICE — BOVIE BLADE COATED &INSULATED - 25/PK

## (undated) DEVICE — SET EXTENSION WITH 2 PORTS (48EA/CA) ***PART #2C8610 IS A SUBSTITUTE*****

## (undated) DEVICE — LACTATED RINGERS INJ 1000 ML - (14EA/CA 60CA/PF)

## (undated) DEVICE — SUCTION INSTRUMENT YANKAUER BULBOUS TIP W/O VENT (50EA/CA)

## (undated) DEVICE — CANISTER SUCTION 3000ML MECHANICAL FILTER AUTO SHUTOFF MEDI-VAC NONSTERILE LF DISP  (40EA/CA)

## (undated) DEVICE — HANDPIECE 10FT INTPLS SCT PLS IRRIGATION HAND CONTROL SET (6/PK)

## (undated) DEVICE — SUTURE GENERAL

## (undated) DEVICE — GOWN WARMING STANDARD FLEX - (30/CA)

## (undated) DEVICE — DRAPE 36X28IN RAD CARM BND BG - (25/CA) O

## (undated) DEVICE — COVER LIGHT HANDLE ALC PLUS DISP (18EA/BX)

## (undated) DEVICE — SODIUM CHL IRRIGATION 0.9% 1000ML (12EA/CA)

## (undated) DEVICE — TOWEL STOP TIMEOUT SAFETY FLAG (40EA/CA)

## (undated) DEVICE — SET LEADWIRE 5 LEAD BEDSIDE DISPOSABLE ECG (1SET OF 5/EA)

## (undated) DEVICE — SENSOR OXIMETER ADULT SPO2 RD SET (20EA/BX)

## (undated) DEVICE — BANDAGE ELASTIC 4 HONEYCOMB - 4"X5YD LF (20/CA)"

## (undated) DEVICE — TUBING CLEARLINK DUO-VENT - C-FLO (48EA/CA)

## (undated) DEVICE — DRAPE U SPLIT IMP 54 X 76 - (24/CA)

## (undated) DEVICE — ELECTRODE DUAL RETURN W/ CORD - (50/PK)

## (undated) DEVICE — CHLORAPREP 26 ML APPLICATOR - ORANGE TINT(25/CA)

## (undated) DEVICE — TIP INTPLS HFLO ML ORFC BTRY - (12/CS)  FOR SURGILAV